# Patient Record
Sex: FEMALE | Race: WHITE | URBAN - METROPOLITAN AREA
[De-identification: names, ages, dates, MRNs, and addresses within clinical notes are randomized per-mention and may not be internally consistent; named-entity substitution may affect disease eponyms.]

---

## 2022-03-15 ENCOUNTER — OFFICE VISIT (OUTPATIENT)
Dept: URBAN - METROPOLITAN AREA CLINIC 28 | Facility: CLINIC | Age: 57
End: 2022-03-15

## 2022-03-15 DIAGNOSIS — H52.03 HYPERMETROPIA, BILATERAL: Primary | ICD-10-CM

## 2022-03-15 PROCEDURE — 92002 INTRM OPH EXAM NEW PATIENT: CPT | Performed by: OPTOMETRIST

## 2022-03-15 PROCEDURE — 92310 CONTACT LENS FITTING OU: CPT | Performed by: OPTOMETRIST

## 2022-03-15 ASSESSMENT — VISUAL ACUITY
OD: 20/20
OS: 20/20

## 2022-03-15 ASSESSMENT — KERATOMETRY
OS: 45.75
OD: 43.75

## 2022-03-15 ASSESSMENT — INTRAOCULAR PRESSURE
OS: 17
OD: 17

## 2022-03-15 NOTE — IMPRESSION/PLAN
Impression: Hypermetropia, bilateral: H52.03. Plan: Discussed diagnosis in detail with patient. Discussed treatment options with patient. New glasses Rx was given today. Patient given new CL's today. Reviewed  CL wearing schedule and risks from over wear, advised to D/C CL's and call if problems occur.

## 2025-03-25 ENCOUNTER — OFFICE VISIT (OUTPATIENT)
Dept: URBAN - METROPOLITAN AREA CLINIC 28 | Facility: CLINIC | Age: 60
End: 2025-03-25
Payer: COMMERCIAL

## 2025-03-25 DIAGNOSIS — H52.03 HYPERMETROPIA, BILATERAL: Primary | ICD-10-CM

## 2025-03-25 PROCEDURE — 92002 INTRM OPH EXAM NEW PATIENT: CPT | Performed by: OPTOMETRIST

## 2025-03-25 PROCEDURE — 92310 CONTACT LENS FITTING OU: CPT | Performed by: OPTOMETRIST

## 2025-03-25 ASSESSMENT — KERATOMETRY
OS: 45.88
OD: 45.75

## 2025-03-25 ASSESSMENT — VISUAL ACUITY
OS: 20/20
OD: 20/20

## 2025-03-25 ASSESSMENT — INTRAOCULAR PRESSURE
OD: 17
OS: 17